# Patient Record
Sex: MALE | Race: WHITE | NOT HISPANIC OR LATINO | URBAN - METROPOLITAN AREA
[De-identification: names, ages, dates, MRNs, and addresses within clinical notes are randomized per-mention and may not be internally consistent; named-entity substitution may affect disease eponyms.]

---

## 2023-08-03 ENCOUNTER — OFFICE VISIT (OUTPATIENT)
Dept: INFECTIOUS DISEASES | Facility: CLINIC | Age: 18
End: 2023-08-03
Payer: COMMERCIAL

## 2023-08-03 VITALS
HEIGHT: 70 IN | SYSTOLIC BLOOD PRESSURE: 111 MMHG | DIASTOLIC BLOOD PRESSURE: 71 MMHG | WEIGHT: 141 LBS | BODY MASS INDEX: 20.19 KG/M2 | OXYGEN SATURATION: 96 % | HEART RATE: 110 BPM

## 2023-08-03 DIAGNOSIS — M86.142 ACUTE OSTEOMYELITIS OF LEFT HAND: ICD-10-CM
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
## 2023-08-03 DIAGNOSIS — S61.452D DOG BITE OF LEFT HAND, SUBSEQUENT ENCOUNTER: ICD-10-CM

## 2023-08-03 DIAGNOSIS — Z79.2 LONG TERM (CURRENT) USE OF ANTIBIOTICS: Primary | ICD-10-CM

## 2023-08-03 PROCEDURE — 99214 OFFICE O/P EST MOD 30 MIN: CPT | Mod: S$GLB,,, | Performed by: INTERNAL MEDICINE

## 2023-08-03 PROCEDURE — 99214 PR OFFICE/OUTPT VISIT, EST, LEVL IV, 30-39 MIN: ICD-10-PCS | Mod: S$GLB,,, | Performed by: INTERNAL MEDICINE

## 2023-08-03 RX ORDER — FAMOTIDINE 20 MG/1
20 TABLET, FILM COATED ORAL 2 TIMES DAILY
COMMUNITY

## 2023-08-03 NOTE — ASSESSMENT & PLAN NOTE
Source of infection, continue wound care, seems to be healing well.   Isotretinoin Pregnancy And Lactation Text: This medication is Pregnancy Category X and is considered extremely dangerous during pregnancy. It is unknown if it is excreted in breast milk. Topical Clindamycin Pregnancy And Lactation Text: This medication is Pregnancy Category B and is considered safe during pregnancy. It is unknown if it is excreted in breast milk. Benzoyl Peroxide Counseling: Patient counseled that medicine may cause skin irritation and bleach clothing. In the event of skin irritation, the patient was advised to reduce the amount of the drug applied or use it less frequently. The patient verbalized understanding of the proper use and possible adverse effects of benzoyl peroxide. All of the patient's questions and concerns were addressed. Isotretinoin Counseling: Patient should get monthly blood tests, not donate blood, not drive at night if vision affected, not share medication, and not undergo elective surgery for 6 months after tx completed. Side effects reviewed, pt to contact office should one occur. Tetracycline Pregnancy And Lactation Text: This medication is Pregnancy Category D and not consider safe during pregnancy. It is also excreted in breast milk. Tazorac Counseling:  Patient advised that medication is irritating and drying. Patient may need to apply sparingly and wash off after an hour before eventually leaving it on overnight. The patient verbalized understanding of the proper use and possible adverse effects of tazorac. All of the patient's questions and concerns were addressed. Doxycycline Counseling:  Patient counseled regarding possible photosensitivity and increased risk for sunburn. Patient instructed to avoid sunlight, if possible. When exposed to sunlight, patients should wear protective clothing, sunglasses, and sunscreen. The patient was instructed to call the office immediately if the following severe adverse effects occur:  hearing changes, easy bruising/bleeding, severe headache, or vision changes. The patient verbalized understanding of the proper use and possible adverse effects of doxycycline. All of the patient's questions and concerns were addressed. Topical Sulfur Applications Counseling: Topical Sulfur Counseling: Patient counseled that this medication may cause skin irritation or allergic reactions. In the event of skin irritation, the patient was advised to reduce the amount of the drug applied or use it less frequently. The patient verbalized understanding of the proper use and possible adverse effects of topical sulfur application. All of the patient's questions and concerns were addressed. Topical Retinoid counseling:  Patient advised to apply a pea-sized amount only at bedtime and wait 30 minutes after washing their face before applying. If too drying, patient may add a non-comedogenic moisturizer. The patient verbalized understanding of the proper use and possible adverse effects of retinoids. All of the patient's questions and concerns were addressed. High Dose Vitamin A Pregnancy And Lactation Text: High dose vitamin A therapy is contraindicated during pregnancy and breast feeding. Dapsone Counseling: I discussed with the patient the risks of dapsone including but not limited to hemolytic anemia, agranulocytosis, rashes, methemoglobinemia, kidney failure, peripheral neuropathy, headaches, GI upset, and liver toxicity. Patients who start dapsone require monitoring including baseline LFTs and weekly CBCs for the first month, then every month thereafter. The patient verbalized understanding of the proper use and possible adverse effects of dapsone. All of the patient's questions and concerns were addressed. Detail Level: Detailed Topical Clindamycin Counseling: Patient counseled that this medication may cause skin irritation or allergic reactions. In the event of skin irritation, the patient was advised to reduce the amount of the drug applied or use it less frequently. The patient verbalized understanding of the proper use and possible adverse effects of clindamycin. All of the patient's questions and concerns were addressed. Topical Retinoid Pregnancy And Lactation Text: This medication is Pregnancy Category C. It is unknown if this medication is excreted in breast milk. Bactrim Counseling:  I discussed with the patient the risks of sulfa antibiotics including but not limited to GI upset, allergic reaction, drug rash, diarrhea, dizziness, photosensitivity, and yeast infections. Rarely, more serious reactions can occur including but not limited to aplastic anemia, agranulocytosis, methemoglobinemia, blood dyscrasias, liver or kidney failure, lung infiltrates or desquamative/blistering drug rashes. Azithromycin Counseling:  I discussed with the patient the risks of azithromycin including but not limited to GI upset, allergic reaction, drug rash, diarrhea, and yeast infections. Birth Control Pills Pregnancy And Lactation Text: This medication should be avoided if pregnant and for the first 30 days post-partum. Erythromycin Pregnancy And Lactation Text: This medication is Pregnancy Category B and is considered safe during pregnancy. It is also excreted in breast milk. Topical Sulfur Applications Pregnancy And Lactation Text: This medication is Pregnancy Category C and has an unknown safety profile during pregnancy. It is unknown if this topical medication is excreted in breast milk. Bactrim Pregnancy And Lactation Text: This medication is Pregnancy Category D and is known to cause fetal risk. It is also excreted in breast milk. Dapsone Pregnancy And Lactation Text: This medication is Pregnancy Category C and is not considered safe during pregnancy or breast feeding. High Dose Vitamin A Counseling: Side effects reviewed, pt to contact office should one occur. Minocycline Counseling: Patient advised regarding possible photosensitivity and discoloration of the teeth, skin, lips, tongue and gums. Patient instructed to avoid sunlight, if possible. When exposed to sunlight, patients should wear protective clothing, sunglasses, and sunscreen. The patient was instructed to call the office immediately if the following severe adverse effects occur:  hearing changes, easy bruising/bleeding, severe headache, or vision changes. The patient verbalized understanding of the proper use and possible adverse effects of minocycline. All of the patient's questions and concerns were addressed. Erythromycin Counseling:  I discussed with the patient the risks of erythromycin including but not limited to GI upset, allergic reaction, drug rash, diarrhea, increase in liver enzymes, and yeast infections. Use Enhanced Medication Counseling?: No Benzoyl Peroxide Pregnancy And Lactation Text: This medication is Pregnancy Category C. It is unknown if benzoyl peroxide is excreted in breast milk. Tazorac Pregnancy And Lactation Text: This medication is not safe during pregnancy. It is unknown if this medication is excreted in breast milk. Spironolactone Counseling: Patient advised regarding risks of diarrhea, abdominal pain, hyperkalemia, birth defects (for female patients), liver toxicity and renal toxicity. The patient may need blood work to monitor liver and kidney function and potassium levels while on therapy. The patient verbalized understanding of the proper use and possible adverse effects of spironolactone. All of the patient's questions and concerns were addressed. Spironolactone Pregnancy And Lactation Text: This medication can cause feminization of the male fetus and should be avoided during pregnancy. The active metabolite is also found in breast milk. Birth Control Pills Counseling: Birth Control Pill Counseling: I discussed with the patient the potential side effects of OCPs including but not limited to increased risk of stroke, heart attack, thrombophlebitis, deep venous thrombosis, hepatic adenomas, breast changes, GI upset, headaches, and depression. The patient verbalized understanding of the proper use and possible adverse effects of OCPs. All of the patient's questions and concerns were addressed. Doxycycline Pregnancy And Lactation Text: This medication is Pregnancy Category D and not consider safe during pregnancy. It is also excreted in breast milk but is considered safe for shorter treatment courses. Tetracycline Counseling: Patient counseled regarding possible photosensitivity and increased risk for sunburn. Patient instructed to avoid sunlight, if possible. When exposed to sunlight, patients should wear protective clothing, sunglasses, and sunscreen. The patient was instructed to call the office immediately if the following severe adverse effects occur:  hearing changes, easy bruising/bleeding, severe headache, or vision changes. The patient verbalized understanding of the proper use and possible adverse effects of tetracycline. All of the patient's questions and concerns were addressed. Patient understands to avoid pregnancy while on therapy due to potential birth defects. Azithromycin Pregnancy And Lactation Text: This medication is considered safe during pregnancy and is also secreted in breast milk.

## 2023-08-03 NOTE — PROGRESS NOTES
Subjective:       Patient ID: Kyaw Anand is a 18 y.o. male.    Chief Complaint: Follow-up (Follow up from dog bite,  on IV Vanc BID and Rocephin IV  antbiotics qd with  Home health.  His hand is looking much better now. He has questions as to when he can start driving. )    Here for follow up from recent hospital stay for dog bite of the left hand with resultant osteomyelitis.  He underwent surgical debridement, cultures did not grow but there were GPC on gram stain.  He is on empiric vancomycin plus ceftriaxone and tolerating it well.  No adverse effects noted, no missed doses.  He is having to adjust dosing schedule due to starting classes soon.  Hand is healing well, he continues to go to wound care and may be starting hyperbarics soon.  No issues with PICC line, no bleeding or drainage.  Plan discussed at length with patient and family.  Review of Systems   Constitutional:  Negative for chills, fatigue and fever.   HENT:  Negative for congestion, ear pain, sinus pain and sore throat.    Respiratory:  Negative for cough and shortness of breath.    Cardiovascular:  Negative for chest pain and palpitations.   Gastrointestinal:  Negative for abdominal pain, diarrhea, nausea and vomiting.   Endocrine: Negative for cold intolerance and heat intolerance.   Genitourinary:  Negative for dysuria and hematuria.   Musculoskeletal:  Negative for back pain, joint swelling and myalgias.   Skin:  Negative for rash and wound.   Neurological:  Negative for weakness, numbness and headaches.   Psychiatric/Behavioral:  Negative for confusion and sleep disturbance.      Objective:      Physical Exam  Vitals and nursing note reviewed.   Constitutional:       Appearance: Normal appearance. He is well-developed.   HENT:      Head: Normocephalic and atraumatic.      Right Ear: External ear normal.      Left Ear: External ear normal.      Mouth/Throat:      Pharynx: Oropharynx is clear. No oropharyngeal exudate.   Eyes:      General:  No scleral icterus.     Conjunctiva/sclera: Conjunctivae normal.   Neck:      Vascular: No JVD.      Trachea: No tracheal deviation.   Cardiovascular:      Rate and Rhythm: Normal rate and regular rhythm.   Pulmonary:      Effort: Pulmonary effort is normal. No respiratory distress.   Musculoskeletal:         General: No tenderness. Normal range of motion.      Cervical back: Neck supple. No rigidity.   Skin:     General: Skin is warm and dry.      Findings: No erythema or rash.      Comments: L hand bandaged, PICC line in place, no bleeding or bruising noted   Neurological:      Mental Status: He is alert and oriented to person, place, and time. Mental status is at baseline.   Psychiatric:         Mood and Affect: Mood normal.         Behavior: Behavior normal.       Assessment:       1. Long term (current) use of antibiotics    2. Dog bite of left hand, subsequent encounter    3. Acute osteomyelitis of left hand        Plan:       Problem List Items Addressed This Visit          ID    Acute osteomyelitis of left hand     On IV antibiotics with vancomycin plus ceftriaxone.  ESR was 14 on 7/27.  Continue weekly labs and antibiotics as planned for 6 weeks total treatment. Continue wound care as well.  RTC 4 weeks or sooner if needed.         Long term (current) use of antibiotics - Primary     No adverse effects noted thus far, monitor.            Orthopedic    Dog bite of left hand     Source of infection, continue wound care, seems to be healing well.

## 2023-08-03 NOTE — ASSESSMENT & PLAN NOTE
On IV antibiotics with vancomycin plus ceftriaxone.  ESR was 14 on 7/27.  Continue weekly labs and antibiotics as planned for 6 weeks total treatment. Continue wound care as well.  RTC 4 weeks or sooner if needed.

## 2023-08-10 ENCOUNTER — PATIENT MESSAGE (OUTPATIENT)
Dept: INFECTIOUS DISEASES | Facility: CLINIC | Age: 18
End: 2023-08-10
Payer: COMMERCIAL

## 2023-08-31 ENCOUNTER — OFFICE VISIT (OUTPATIENT)
Dept: INFECTIOUS DISEASES | Facility: CLINIC | Age: 18
End: 2023-08-31
Payer: COMMERCIAL

## 2023-08-31 DIAGNOSIS — Z79.2 LONG TERM (CURRENT) USE OF ANTIBIOTICS: ICD-10-CM

## 2023-08-31 DIAGNOSIS — M86.142 ACUTE OSTEOMYELITIS OF LEFT HAND: ICD-10-CM

## 2023-08-31 PROCEDURE — 1160F RVW MEDS BY RX/DR IN RCRD: CPT | Mod: CPTII,S$GLB,, | Performed by: INTERNAL MEDICINE

## 2023-08-31 PROCEDURE — 1159F PR MEDICATION LIST DOCUMENTED IN MEDICAL RECORD: ICD-10-PCS | Mod: CPTII,S$GLB,, | Performed by: INTERNAL MEDICINE

## 2023-08-31 PROCEDURE — 99213 PR OFFICE/OUTPT VISIT, EST, LEVL III, 20-29 MIN: ICD-10-PCS | Mod: S$GLB,,, | Performed by: INTERNAL MEDICINE

## 2023-08-31 PROCEDURE — 99213 OFFICE O/P EST LOW 20 MIN: CPT | Mod: S$GLB,,, | Performed by: INTERNAL MEDICINE

## 2023-08-31 PROCEDURE — 1159F MED LIST DOCD IN RCRD: CPT | Mod: CPTII,S$GLB,, | Performed by: INTERNAL MEDICINE

## 2023-08-31 PROCEDURE — 1160F PR REVIEW ALL MEDS BY PRESCRIBER/CLIN PHARMACIST DOCUMENTED: ICD-10-PCS | Mod: CPTII,S$GLB,, | Performed by: INTERNAL MEDICINE

## 2023-08-31 NOTE — ASSESSMENT & PLAN NOTE
Improved, completed a prolonged course of IV antibiotics.  ESR and CRP normal.    PICC line removed today intact, no bleeding or other adverse effects noted.    Discussed signs of possible recurrence and to contact us if they develop.

## 2023-08-31 NOTE — PROGRESS NOTES
Subjective:       Patient ID: Kyaw Anand is a 18 y.o. male.    Chief Complaint: Follow-up    Here for follow up on osteomyelitis of the left hand secondary to dog bite.  Did well with IV antibiotics, no adverse effects noted.  Completed therapy this morning.  Hand is doing well, no pain, ROM improved.  Wound healed.  No complications from PICC line.  No fever or chills.  Review of Systems   Constitutional:  Negative for chills and fever.   Respiratory:  Negative for cough and shortness of breath.    Cardiovascular:  Negative for chest pain and palpitations.   Gastrointestinal:  Negative for diarrhea, nausea and vomiting.     Objective:      Physical Exam  Vitals and nursing note reviewed.   Constitutional:       Appearance: Normal appearance. He is well-developed.   HENT:      Right Ear: External ear normal.      Left Ear: External ear normal.   Eyes:      General: No scleral icterus.     Conjunctiva/sclera: Conjunctivae normal.   Cardiovascular:      Rate and Rhythm: Normal rate and regular rhythm.      Heart sounds: Normal heart sounds. No murmur heard.  Pulmonary:      Effort: Pulmonary effort is normal. No respiratory distress.      Breath sounds: Normal breath sounds. No wheezing.   Abdominal:      General: Bowel sounds are normal. There is no distension.      Palpations: Abdomen is soft.      Tenderness: There is no abdominal tenderness. There is no guarding.   Skin:     General: Skin is warm and dry.      Findings: No rash.      Comments: L hand incision healed   Neurological:      Mental Status: He is alert and oriented to person, place, and time. Mental status is at baseline.   Psychiatric:         Mood and Affect: Mood normal.         Behavior: Behavior normal.       Assessment:       1. Acute osteomyelitis of left hand    2. Long term (current) use of antibiotics        Plan:       Problem List Items Addressed This Visit          ID    Acute osteomyelitis of left hand     Improved, completed a  prolonged course of IV antibiotics.  ESR and CRP normal.    PICC line removed today intact, no bleeding or other adverse effects noted.    Discussed signs of possible recurrence and to contact us if they develop.         Long term (current) use of antibiotics     No adverse effects noted.

## 2025-02-26 ENCOUNTER — OFFICE VISIT (OUTPATIENT)
Dept: URGENT CARE | Facility: CLINIC | Age: 20
End: 2025-02-26
Payer: COMMERCIAL

## 2025-02-26 VITALS
DIASTOLIC BLOOD PRESSURE: 71 MMHG | HEIGHT: 71 IN | RESPIRATION RATE: 18 BRPM | TEMPERATURE: 99 F | SYSTOLIC BLOOD PRESSURE: 105 MMHG | OXYGEN SATURATION: 95 % | BODY MASS INDEX: 21 KG/M2 | HEART RATE: 109 BPM | WEIGHT: 150 LBS

## 2025-02-26 DIAGNOSIS — R50.9 FEVER, UNSPECIFIED FEVER CAUSE: ICD-10-CM

## 2025-02-26 DIAGNOSIS — J10.1 INFLUENZA A VIRUS PRESENT: ICD-10-CM

## 2025-02-26 DIAGNOSIS — J10.1 INFLUENZA A: Primary | ICD-10-CM

## 2025-02-26 DIAGNOSIS — J10.1 UPPER RESPIRATORY TRACT INFECTION DUE TO INFLUENZA A VIRUS: ICD-10-CM

## 2025-02-26 LAB
CTP QC/QA: YES
POC MOLECULAR INFLUENZA A AGN: POSITIVE
POC MOLECULAR INFLUENZA B AGN: NEGATIVE

## 2025-02-26 PROCEDURE — 99499 UNLISTED E&M SERVICE: CPT | Mod: S$GLB,,, | Performed by: NURSE PRACTITIONER

## 2025-02-26 RX ORDER — DEXTROAMPHETAMINE SACCHARATE, AMPHETAMINE ASPARTATE, DEXTROAMPHETAMINE SULFATE AND AMPHETAMINE SULFATE 2.5; 2.5; 2.5; 2.5 MG/1; MG/1; MG/1; MG/1
10 TABLET ORAL
COMMUNITY
Start: 2025-01-14

## 2025-02-26 RX ORDER — OSELTAMIVIR PHOSPHATE 75 MG/1
75 CAPSULE ORAL 2 TIMES DAILY
Qty: 10 CAPSULE | Refills: 0 | Status: SHIPPED | OUTPATIENT
Start: 2025-02-26 | End: 2025-03-03

## 2025-02-26 NOTE — PATIENT INSTRUCTIONS
Please follow up with your primary care provider within 2-5 days if your signs and symptoms have not resolved or worsen.     If your condition worsens or fails to improve we recommend that you receive another evaluation at the emergency room immediately or contact your primary medical clinic to discuss your concerns.   You must understand that you have received an Urgent Care treatment only and that you may be released before all of your medical problems are known or treated. You, the patient, will arrange for follow up care as instructed.     Start taking Tamiflu today.  Alternate tylenol/Motrin as needed for fever.  Increase oral fluids.  Rest.  OTC PLAIN Zyrtec once every evening.    OTC throat pain mixture Instructions    Combine the following ingredients:    10 ml of Maalox (Aluminum-magnesium hydroxide-simethicone 200-200-20 mg/5 ml) PLUS    10 ml of Tylenol (Acetaminophen 160 mg/5 ml) PLUS     5 ml of Benadryl (Diphenhydramine 12.5 mg/5 ml)     Take mixture as a single dose; gargle then swallow every 6 hours as needed for throat pain relief.  Refrigerate mixture for optimal comfort/pain relief.

## 2025-02-26 NOTE — PROGRESS NOTES
"Subjective:      Patient ID: Kyaw Anand is a 19 y.o. male.    Vitals:  height is 5' 11" (1.803 m) and weight is 68 kg (150 lb). His oral temperature is 99.2 °F (37.3 °C). His blood pressure is 105/71 and his pulse is 109. His respiration is 18 and oxygen saturation is 95%.     Chief Complaint: Fever and Sore Throat    [employer] student presents with fever, sore throat, and fatigue   + flu exposure- coworkers  -started late Sunday night but s/s worsened yesterday  -no OTC meds taken  -c/o congestion, headache, and elevated temp at home  -1/10 pain    Fever   Associated symptoms include congestion, coughing, headaches and a sore throat. Pertinent negatives include no nausea, rash or vomiting.   Sore Throat   Associated symptoms include congestion, coughing and headaches. Pertinent negatives include no neck pain or vomiting.     Constitution: Positive for chills, sweating, fatigue and fever.   HENT:  Positive for congestion, postnasal drip, sinus pressure and sore throat.    Neck: Negative for neck pain, neck stiffness and painful lymph nodes.   Respiratory:  Positive for cough. Negative for sputum production.    Gastrointestinal:  Negative for nausea and vomiting.   Musculoskeletal:  Positive for muscle ache.   Skin:  Negative for color change, pale and rash.   Neurological:  Positive for headaches. Negative for dizziness, disorientation and altered mental status.   Hematologic/Lymphatic: Negative for swollen lymph nodes.   Psychiatric/Behavioral:  Negative for altered mental status, disorientation and confusion.       Objective:     Physical Exam   Constitutional: He is oriented to person, place, and time.  Non-toxic appearance. He appears ill. No distress.   HENT:   Head: Normocephalic and atraumatic.   Nose: Mucosal edema, rhinorrhea and congestion present. Right sinus exhibits no maxillary sinus tenderness and no frontal sinus tenderness. Left sinus exhibits no maxillary sinus tenderness and no frontal sinus " tenderness.   Mouth/Throat: Uvula is midline and mucous membranes are normal. Mucous membranes are moist. No trismus in the jaw. No uvula swelling. Posterior oropharyngeal erythema and cobblestoning present. No tonsillar exudate.   Eyes: Conjunctivae are normal.   Neck: No neck rigidity present.   Cardiovascular: Normal rate and normal pulses.   Pulmonary/Chest: Effort normal and breath sounds normal.   Musculoskeletal: Normal range of motion.         General: Normal range of motion.   Lymphadenopathy:     He has cervical adenopathy.        Right cervical: Superficial cervical adenopathy present.        Left cervical: Superficial cervical adenopathy present.   Neurological: He is alert and oriented to person, place, and time.   Skin: Skin is warm, dry, not diaphoretic and no rash.   Psychiatric: His behavior is normal. Mood normal.   Nursing note and vitals reviewed.      Assessment:     1. Influenza A    2. Upper respiratory tract infection due to influenza A virus    3. Influenza A virus present    4. Fever, unspecified fever cause        Plan:     Office Visit on 02/26/2025   Component Date Value Ref Range Status    POC Molecular Influenza A Ag 02/26/2025 Positive (A)  Negative Final    POC Molecular Influenza B Ag 02/26/2025 Negative  Negative Final     Acceptable 02/26/2025 Yes   Final         Influenza A  -     dexbrompheniramine-phenylep-DM 2-10-20 mg Tab; Take 1 tablet by mouth every 6 to 8 hours as needed (congestion/cough/allergy symptoms).  Dispense: 15 tablet; Refill: 0    Upper respiratory tract infection due to influenza A virus  -     dexbrompheniramine-phenylep-DM 2-10-20 mg Tab; Take 1 tablet by mouth every 6 to 8 hours as needed (congestion/cough/allergy symptoms).  Dispense: 15 tablet; Refill: 0    Influenza A virus present  -     oseltamivir (TAMIFLU) 75 MG capsule; Take 1 capsule (75 mg total) by mouth 2 (two) times daily. for 5 days  Dispense: 10 capsule; Refill: 0  -      dexbrompheniramine-phenylep-DM 2-10-20 mg Tab; Take 1 tablet by mouth every 6 to 8 hours as needed (congestion/cough/allergy symptoms).  Dispense: 15 tablet; Refill: 0    Fever, unspecified fever cause  -     POCT Influenza A/B MOLECULAR             Patient Instructions   Please follow up with your primary care provider within 2-5 days if your signs and symptoms have not resolved or worsen.     If your condition worsens or fails to improve we recommend that you receive another evaluation at the emergency room immediately or contact your primary medical clinic to discuss your concerns.   You must understand that you have received an Urgent Care treatment only and that you may be released before all of your medical problems are known or treated. You, the patient, will arrange for follow up care as instructed.     Start taking Tamiflu today.  Alternate tylenol/Motrin as needed for fever.  Increase oral fluids.  Rest.  OTC PLAIN Zyrtec once every evening.    OTC throat pain mixture Instructions    Combine the following ingredients:    10 ml of Maalox (Aluminum-magnesium hydroxide-simethicone 200-200-20 mg/5 ml) PLUS    10 ml of Tylenol (Acetaminophen 160 mg/5 ml) PLUS     5 ml of Benadryl (Diphenhydramine 12.5 mg/5 ml)     Take mixture as a single dose; gargle then swallow every 6 hours as needed for throat pain relief.  Refrigerate mixture for optimal comfort/pain relief.

## 2025-02-26 NOTE — LETTER
February 26, 2025      Urgent Care - [address] [address]  Phone: [phone]  Fax: [phone]       Patient: Kyaw Anand   YOB: 2005  Date of Visit: 02/26/2025    To Whom It May Concern:    DIANA Anand  was at [facility] on 02/26/2025. The patient may return to work/school on 3/3/2025 with no restrictions. If you have any questions or concerns, or if I can be of further assistance, please do not hesitate to contact me.    Sincerely,    [place] NP